# Patient Record
Sex: MALE | Race: WHITE | Employment: FULL TIME | URBAN - METROPOLITAN AREA
[De-identification: names, ages, dates, MRNs, and addresses within clinical notes are randomized per-mention and may not be internally consistent; named-entity substitution may affect disease eponyms.]

---

## 2018-12-21 ENCOUNTER — APPOINTMENT (OUTPATIENT)
Dept: CT IMAGING | Facility: CLINIC | Age: 58
End: 2018-12-21
Attending: EMERGENCY MEDICINE
Payer: COMMERCIAL

## 2018-12-21 ENCOUNTER — HOSPITAL ENCOUNTER (EMERGENCY)
Facility: CLINIC | Age: 58
Discharge: HOME | End: 2018-12-21
Attending: EMERGENCY MEDICINE | Admitting: EMERGENCY MEDICINE
Payer: COMMERCIAL

## 2018-12-21 VITALS
SYSTOLIC BLOOD PRESSURE: 139 MMHG | RESPIRATION RATE: 16 BRPM | HEART RATE: 64 BPM | OXYGEN SATURATION: 98 % | TEMPERATURE: 97.5 F | DIASTOLIC BLOOD PRESSURE: 77 MMHG | WEIGHT: 196.87 LBS

## 2018-12-21 DIAGNOSIS — R55 SYNCOPE, UNSPECIFIED SYNCOPE TYPE: ICD-10-CM

## 2018-12-21 DIAGNOSIS — S09.90XA CLOSED HEAD INJURY, INITIAL ENCOUNTER: ICD-10-CM

## 2018-12-21 LAB — INTERPRETATION ECG - MUSE: NORMAL

## 2018-12-21 PROCEDURE — 93005 ELECTROCARDIOGRAM TRACING: CPT

## 2018-12-21 PROCEDURE — 70450 CT HEAD/BRAIN W/O DYE: CPT

## 2018-12-21 PROCEDURE — 99284 EMERGENCY DEPT VISIT MOD MDM: CPT | Mod: 25

## 2018-12-21 RX ORDER — ASPIRIN 81 MG/1
81 TABLET ORAL DAILY
COMMUNITY

## 2018-12-21 RX ORDER — BUPIVACAINE HYDROCHLORIDE 5 MG/ML
INJECTION, SOLUTION PERINEURAL
Status: DISCONTINUED
Start: 2018-12-21 | End: 2018-12-21 | Stop reason: HOSPADM

## 2018-12-21 RX ORDER — LIDOCAINE HYDROCHLORIDE AND EPINEPHRINE 10; 10 MG/ML; UG/ML
INJECTION, SOLUTION INFILTRATION; PERINEURAL
Status: DISCONTINUED
Start: 2018-12-21 | End: 2018-12-21 | Stop reason: HOSPADM

## 2018-12-21 SDOH — HEALTH STABILITY: MENTAL HEALTH: HOW OFTEN DO YOU HAVE A DRINK CONTAINING ALCOHOL?: NEVER

## 2018-12-21 ASSESSMENT — ENCOUNTER SYMPTOMS
NECK PAIN: 0
WOUND: 1
VOMITING: 0
DIZZINESS: 0
DIARRHEA: 0
WEAKNESS: 0
APPETITE CHANGE: 0
NUMBNESS: 0
SHORTNESS OF BREATH: 0
HEADACHES: 0
BACK PAIN: 0

## 2018-12-21 NOTE — ED TRIAGE NOTES
Pt presents to ED for evaluation of laceration on forehead.  Pt states he had a leg cramp which woke him from sleep, he got out of bed to walk the cramp off.  States he then went to use the bathroom, while in the bathroom he got dizzy.  He remembers reaching for the back of the toilet and knocking things down, then per his wife he turned around and fell forward on to the tiled floor.  Wife states he was only blacked out for a few seconds.  Laceration of forehead is covered with band aid, bleeding controlled.  Pt denies pain, denies current dizziness, is A&O x4.

## 2018-12-21 NOTE — DISCHARGE INSTRUCTIONS
Discharge Instructions  Head Injury    You have been seen today for a head injury. Your evaluation included a history and physical examination. You may have had a CT (CAT) scan performed, though most head injuries do not require a scan. Based on this evaluation, your provider today does not feel that your head injury is serious.    Generally, every Emergency Department visit should have a follow-up clinic visit with either a primary or a specialty clinic/provider. Please follow-up as instructed by your emergency provider today.  Return to the Emergency Department if:  You are confused or you are not acting right.  Your headache gets worse or you start to have a really bad headache even with your recommended treatment plan.  You vomit (throw up) more than once.  You have a seizure.  You have trouble walking.  You have weakness or paralysis (cannot move) in an arm or a leg.  You have blood or fluid coming from your ears or nose.  You have new symptoms or anything that worries you.    Sleeping:  It is okay for you to sleep, but someone should wake you up if instructed by your provider, and someone should check on you at your usual time to wake up.     Activity:  Do not drive for at least 24 hours.  Do not drive if you have dizzy spells or trouble concentrating, or remembering things.  Do not return to any contact sports until cleared by your regular provider.     MORE INFORMATION:    Concussion:  A concussion is a minor head injury that may cause temporary problems with the way the brain works. Although concussions are important, they are generally not an emergency or a reason that a person needs to be hospitalized. Some concussion symptoms include confusion, amnesia (forgetful), nausea (sick to your stomach) and vomiting (throwing up), dizziness, fatigue, memory or concentration problems, irritability and sleep problems. For most people, concussions are mild and temporary but some will have more severe and persistent  symptoms that require on-going care and treatment.  CT Scans: Your evaluation today may have included a CT scan (CAT scan) to look for things like bleeding or a skull fracture (broken bone).  CT scans involve radiation and too many CT scans can cause serious health problems like cancer, especially in children.  Because of this, your provider may not have ordered a CT scan today if they think you are at low risk for a serious or life threatening problem.    If you were given a prescription for medicine here today, be sure to read all of the information (including the package insert) that comes with your prescription.  This will include important information about the medicine, its side effects, and any warnings that you need to know about.  The pharmacist who fills the prescription can provide more information and answer questions you may have about the medicine.  If you have questions or concerns that the pharmacist cannot address, please call or return to the Emergency Department.     Remember that you can always come back to the Emergency Department if you are not able to see your regular provider in the amount of time listed above, if you get any new symptoms, or if there is anything that worries you.  Discharge Instructions  Syncope    Syncope (fainting) is a sudden, short loss of consciousness (passing out spell). People will usually fall to the ground when they faint or slump over if seated.  People may also shake when this happens, and it can sometimes be difficult to tell the difference between syncope and a seizure. At this time, your provider does not find a reason to suspect that your fainting spell is a sign of anything dangerous or life-threatening.  However, sometimes the signs of serious illness do not show up right away.     Generally, every Emergency Department visit should have a follow-up clinic visit with either a primary or a specialty clinic/provider. Please follow-up as instructed by your  emergency provider today.    Return to the Emergency Department if:  You faint again.   You have any significant bleeding.  You have chest pain or a fast or irregular heartbeat.  You feel short of breath.  You cough up any blood.  You have abdominal (belly) pain or unusual back pain.  You have ongoing vomiting (throwing up) or diarrhea (loose stools).  You have a black or tarry bowel movement, or blood in the stool or in your vomit.  You have a fever over 101 F.  You lose feeling or cannot move a part of your body or cannot talk normally.  You are confused, have a headache, cannot see well, or have a seizure.  DO NOT DRIVE. CALL 911 INSTEAD!    What can I do to help myself?  Follow any specific instructions that your provider discussed with you.  If you feel light-headed, make sure to sit down right away, even if you have to sit on the floor.  Follow up with your regular medical provider as discussed for further management. This may include lowering your blood pressure medications, insulin or other diabetic medications, checking your blood sugar more frequently, and drinking more fluids, taking medicines for vomiting or diarrhea or getting up slower.  If you were given a prescription for medicine here today, be sure to read all of the information (including the package insert) that comes with your prescription.  This will include important information about the medicine, its side effects, and any warnings that you need to know about.  The pharmacist who fills the prescription can provide more information and answer questions you may have about the medicine.  If you have questions or concerns that the pharmacist cannot address, please call or return to the Emergency Department.   Remember that you can always come back to the Emergency Department if you are not able to see your regular provider in the amount of time listed above, if you get any new symptoms, or if there is anything that worries you.

## 2018-12-21 NOTE — ED AVS SNAPSHOT
[facility] Emergency Department  [address]  [facility] [address]  Phone:  [phone]  Fax:  [phone]                                    Jared Mcgill   MRN: [id number]    Department:  [facility] Emergency Department   Date of Visit:  12/21/2018           After Visit Summary Signature Page    I have received my discharge instructions, and my questions have been answered. I have discussed any challenges I see with this plan with the nurse or doctor.    ..........................................................................................................................................  Patient/Patient Representative Signature      ..........................................................................................................................................  Patient Representative Print Name and Relationship to Patient    ..................................................               ................................................  Date                                   Time    ..........................................................................................................................................  Reviewed by Signature/Title    ...................................................              ..............................................  Date                                               Time          22EPIC Rev 08/18

## 2018-12-21 NOTE — ED AVS SNAPSHOT
[facility] EMERGENCY DEPARTMENT: [phone]                                              INTERAGENCY TRANSFER FORM - LAB / IMAGING / EKG / EMG RESULTS   2018                   Hospital Admission Date: 2018  MACARIO SHERIDAN   : 1960  Sex: Male         Attending Provider:  Alecia Pruitt MD    Allergies:  No Known Allergies    Infection:  None   Service:  EMERGENCY ME    Ht:  --   Wt:  89.3 kg (196 lb 13.9 oz)   Admission Wt:  89.3 kg (196 lb 13.9 oz)    BMI:  --   BSA:  --            Patient PCP Information     Provider PCP Type    [facility]      Unresulted Labs (24h ago, onward)    None         Imaging Results - 3 Days      CT Head w/o Contrast [[id number]]  Resulted: 18 035, Result status: Preliminary result   Ordering provider:  Alecia Pruitt MD  18 0205 Resulted by:  Kishore Eubanks MD   Performed:  18 0308 - 18 0323 Accession number:  [id number]   Resulting lab:  RADIOLOGY RESULTS   Narrative:  CT HEAD W/O CONTRAST  2018 3:23 AM     HISTORY: Head trauma, minor, GCS >= 13, low clinical risk, initial  exam.    TECHNIQUE: Axial images of the head and coronal reformations without  IV contrast material. Radiation dose for this scan was reduced using  automated exposure control, adjustment of the mA and/or kV according  to patient size, or iterative reconstruction technique.    COMPARISON: None.    FINDINGS: No intracranial hemorrhage, mass or mass effect. No acute  infarct identified. No shift of midline structures. The ventricles are  symmetric. Mild anterior scalp soft tissue swelling. No skull  fractures.     Impression:  IMPRESSION:  1 No acute intracranial abnormality.  2. Mild anterior scalp soft tissue swelling/hematoma. No skull  fracture.    This agrees with the preliminary report communicated to the emergency  room by Dr. Eubanks.      Testing Performed By     Lab - Abbreviation Name Director  Address Valid Date Range    104 - Rad Rslts RADIOLOGY RESULTS Unknown Unknown 02/16/05 1553 - Present            Encounter-Level Documents:    There are no encounter-level documents.     Order-Level Documents:    There are no order-level documents.

## 2018-12-21 NOTE — ED PROVIDER NOTES
History     Chief Complaint:  Syncope & Facial Laceration    HPI   Jared Mcgill is a 58 year old male who presents to the emergency department for evaluation of a facial laceration. Tonight, he reports waking up with a cramp to his left calf, so he decided to go to the bathroom in an attempt to walk off the pain. As he was on the toilet, he reports an onset of dizziness and then fell to the ground. His wife heard the thud and rushed to his side; she reports he was unconscious for 30 seconds. He sustained a laceration to his forehead during the fall, so he decided to present to the ED for a possible repair. Here, he reports he was at baseline and not dizzy during the day. He also denies any chest pain, headaches, or lower extremity edema. The cramp has since resolved as well. He has no history of syncope or leg pain and is not anticoagulated other than a daily baby aspirin. He has no other complaints.     Allergies:  NKDA    Medications:    Baby Aspirin    Past Medical History:    The patient denies any significant past medical history.    Past Surgical History:    The patient does not have any pertinent past surgical history.    Family History:    No past pertinent family history.    Social History:  Marital Status:   [2]  Negative for tobacco use.  Positive for alcohol use.      Review of Systems   Constitutional: Negative for appetite change.   Respiratory: Negative for shortness of breath.    Cardiovascular: Negative for chest pain.   Gastrointestinal: Negative for diarrhea and vomiting.   Musculoskeletal: Negative for back pain and neck pain.   Skin: Positive for wound.   Neurological: Positive for syncope. Negative for dizziness, weakness, numbness and headaches.   All other systems reviewed and are negative.    Physical Exam     Patient Vitals for the past 24 hrs:   BP Temp Temp src Pulse Heart Rate Resp SpO2 Weight   12/21/18 0200 142/88 -- -- 66 -- -- 100 % --   12/21/18 0145 133/79 -- -- 71 -- --  99 % --   12/21/18 0116 147/82 97.5  F (36.4  C) Oral -- 69 16 100 % 89.3 kg (196 lb 13.9 oz)     Physical Exam  Gen: Pleasant, appears stated age.    Eye:   Pupils are equal, round, and reactive.     Sclera non-injected.    ENT:   Moist mucus membranes.     Normal tongue.    Oropharynx without lesions.    Cardiac:     Normal rate and regular rhythm.    No murmurs, gallops, or rubs.   2+ radial pulses.     Pulmonary:     Clear to auscultation bilaterally.    No wheezes, rales, or rhonchi.    Abdomen:     Normal active bowel sounds.     Abdomen is soft and non-distended, without focal tenderness.    Musculoskeletal:     Normal movement of all extremities without evidence for deficit. Calves nontender bilaterally. No midline cervical, thoracic, or lumbar spinal tenderness.     Extremities:    No edema.    Skin:   Warm and dry. 3.5cm linear vertical laceration to forehead.    Neurologic:    Non-focal exam without asymmetric weakness or numbness.    Normal tone    Psychiatric:     Normal affect with appropriate interaction with examiner.    Emergency Department Course   ECG:  Indication: Leg Pain  Time: 0126  Vent. Rate 68 bpm. IL interval 140. QRS duration 92. QT/QTc 410/435. P-R-T axis 50 3 23.  Normal sinus rhythm. Normal ECG. Read time: 0205.    Imaging:  Radiographic findings were communicated with the patient who voiced understanding of the findings.    CT Head without contrast:   Mild anterior scalp soft tissue swelling. No intracranial hemorrhage or mass effect. No skull fracture, as per radiology.    Procedures:    Narrative: Procedure: Laceration Repair        LACERATION:  A simple clean 3.5 cm laceration.      LOCATION:  Forehead      FUNCTION:  Distally sensation and circulation are intact.      ANESTHESIA:  Local using bupivacaine 0.5%       PREPARATION:  Irrigation and Scrubbing with Normal Saline and Shur Clens      DEBRIDEMENT:  no debridement      CLOSURE:  Wound was closed with One Layer.  Skin closed  with 6.0 fast absorbing GUT using running sutures.    Emergency Department Course:  Nursing notes and vitals reviewed. (0153) I performed an exam of the patient as documented above.      IV inserted. Medicine administered as documented above. Blood drawn. This was sent to the lab for further testing, results above.     The patient was sent for a head CT while in the emergency department, findings above.      (0350) I rechecked the patient and discussed the results of his workup thus far. I performed a laceration repair, as noted above. There were no complications of the procedure.     (0452) I rechecked the patient and adjusted his dressings.     Findings and plan explained to the Patient. Patient discharged home with instructions regarding supportive care, medications, and reasons to return. The importance of close follow-up was reviewed.      I personally reviewed the imaging results with the Patient and answered all related questions prior to discharge.     Impression & Plan      Medical Decision Making:  Jared Mcgill is a 58 year old male who presents today following syncopal episode with a forehead laceration. The patient has a normal neurological exam. He is not anticoagulated. CT of the head is negative for an intracranial hemorrhage, skull fracture, or other acute injury. EKG is unremarkable, specifically there is no evidence for acute MI, Brugada syndrome, or otherwise. Tetanus is up to date. He did complain of some left leg pain, although at this point I do not suspect a DVT. He had some leg cramping that seems to have gone away fairly quickly. No evidence of leg swelling. At this point, I think he is safe to be discharged home.     Critical Care time:  none    Diagnosis:    ICD-10-CM    1. Closed head injury, initial encounter S09.90XA    2. Syncope, unspecified syncope type R55        Disposition:  discharged to home    Scribe Disclosure:  I, Dedra Lorenzo, am serving as a scribe on 12/21/2018 at 1:53  AM to personally document services performed by Alecia Pruitt MD based on my observations and the provider's statements to me.     Dedra Bj  12/21/2018   [facility] EMERGENCY DEPARTMENT       Alecia Pruitt MD  12/21/18 [id number]